# Patient Record
Sex: FEMALE | Race: BLACK OR AFRICAN AMERICAN
[De-identification: names, ages, dates, MRNs, and addresses within clinical notes are randomized per-mention and may not be internally consistent; named-entity substitution may affect disease eponyms.]

---

## 2019-05-31 ENCOUNTER — HOSPITAL ENCOUNTER (EMERGENCY)
Dept: HOSPITAL 62 - ER | Age: 54
LOS: 1 days | Discharge: HOME | End: 2019-06-01
Payer: COMMERCIAL

## 2019-05-31 DIAGNOSIS — R19.7: Primary | ICD-10-CM

## 2019-05-31 DIAGNOSIS — R10.84: ICD-10-CM

## 2019-05-31 DIAGNOSIS — F17.210: ICD-10-CM

## 2019-05-31 DIAGNOSIS — E86.0: ICD-10-CM

## 2019-05-31 LAB
ADD MANUAL DIFF: NO
ALBUMIN SERPL-MCNC: 5.6 G/DL (ref 3.5–5)
ALP SERPL-CCNC: 171 U/L (ref 38–126)
ALT SERPL-CCNC: 47 U/L (ref 9–52)
ANION GAP SERPL CALC-SCNC: 21 MMOL/L (ref 5–19)
APPEARANCE UR: (no result)
APTT PPP: YELLOW S
AST SERPL-CCNC: 26 U/L (ref 14–36)
BASOPHILS # BLD AUTO: 0 10^3/UL (ref 0–0.2)
BASOPHILS NFR BLD AUTO: 0.5 % (ref 0–2)
BILIRUB DIRECT SERPL-MCNC: 0.4 MG/DL (ref 0–0.4)
BILIRUB SERPL-MCNC: 0.6 MG/DL (ref 0.2–1.3)
BILIRUB UR QL STRIP: (no result)
BUN SERPL-MCNC: 19 MG/DL (ref 7–20)
CALCIUM: 11 MG/DL (ref 8.4–10.2)
CHLORIDE SERPL-SCNC: 101 MMOL/L (ref 98–107)
CO2 SERPL-SCNC: 21 MMOL/L (ref 22–30)
EOSINOPHIL # BLD AUTO: 0 10^3/UL (ref 0–0.6)
EOSINOPHIL NFR BLD AUTO: 0.5 % (ref 0–6)
ERYTHROCYTE [DISTWIDTH] IN BLOOD BY AUTOMATED COUNT: 14.7 % (ref 11.5–14)
GLUCOSE SERPL-MCNC: 139 MG/DL (ref 75–110)
GLUCOSE UR STRIP-MCNC: 50 MG/DL
HCT VFR BLD CALC: 48.1 % (ref 36–47)
HGB BLD-MCNC: 15.7 G/DL (ref 12–15.5)
KETONES UR STRIP-MCNC: (no result) MG/DL
LIPASE SERPL-CCNC: 285.5 U/L (ref 23–300)
LYMPHOCYTES # BLD AUTO: 1.8 10^3/UL (ref 0.5–4.7)
LYMPHOCYTES NFR BLD AUTO: 19.4 % (ref 13–45)
MCH RBC QN AUTO: 26.8 PG (ref 27–33.4)
MCHC RBC AUTO-ENTMCNC: 32.7 G/DL (ref 32–36)
MCV RBC AUTO: 82 FL (ref 80–97)
MONOCYTES # BLD AUTO: 0.7 10^3/UL (ref 0.1–1.4)
MONOCYTES NFR BLD AUTO: 7.4 % (ref 3–13)
NEUTROPHILS # BLD AUTO: 6.6 10^3/UL (ref 1.7–8.2)
NEUTS SEG NFR BLD AUTO: 72.2 % (ref 42–78)
NITRITE UR QL STRIP: NEGATIVE
PH UR STRIP: 5 [PH] (ref 5–9)
PLATELET # BLD: 342 10^3/UL (ref 150–450)
POTASSIUM SERPL-SCNC: 3.6 MMOL/L (ref 3.6–5)
PROT SERPL-MCNC: 9.3 G/DL (ref 6.3–8.2)
PROT UR STRIP-MCNC: 100 MG/DL
RBC # BLD AUTO: 5.87 10^6/UL (ref 3.72–5.28)
SODIUM SERPL-SCNC: 143.4 MMOL/L (ref 137–145)
SP GR UR STRIP: 1.03
TOTAL CELLS COUNTED % (AUTO): 100 %
UROBILINOGEN UR-MCNC: NEGATIVE MG/DL (ref ?–2)
WBC # BLD AUTO: 9.1 10^3/UL (ref 4–10.5)

## 2019-05-31 PROCEDURE — 85025 COMPLETE CBC W/AUTO DIFF WBC: CPT

## 2019-05-31 PROCEDURE — 80053 COMPREHEN METABOLIC PANEL: CPT

## 2019-05-31 PROCEDURE — 87205 SMEAR GRAM STAIN: CPT

## 2019-05-31 PROCEDURE — 99284 EMERGENCY DEPT VISIT MOD MDM: CPT

## 2019-05-31 PROCEDURE — 81001 URINALYSIS AUTO W/SCOPE: CPT

## 2019-05-31 PROCEDURE — 87045 FECES CULTURE AEROBIC BACT: CPT

## 2019-05-31 PROCEDURE — 89055 LEUKOCYTE ASSESSMENT FECAL: CPT

## 2019-05-31 PROCEDURE — 83690 ASSAY OF LIPASE: CPT

## 2019-05-31 PROCEDURE — 36415 COLL VENOUS BLD VENIPUNCTURE: CPT

## 2019-05-31 PROCEDURE — 87493 C DIFF AMPLIFIED PROBE: CPT

## 2019-05-31 NOTE — ER DOCUMENT REPORT
ED Medical Screen (RME)





- General


Chief Complaint: Diarrhea


Stated Complaint: DIARRHEA


Time Seen by Provider: 05/31/19 18:24


Mode of Arrival: Ambulatory


Information source: Patient


Notes: 





Patient is a 54-year-old female comes emergency room with a complaint of having 

diarrhea ever since May 22.  She states that she went to the walk-in clinic on 

this past Tuesday because of the diarrhea and she was placed on Cipro, potassium

supplement, and Phenergan.  She states that labs were run and she was told her 

labs were normal but decided to put her on antibiotic anyway.  She states that 

she has not had any let up in her diarrhea.  She is been unable to eat very much

at all daily since the diarrhea started she is also had vomiting.  She states 

that her stool on the first 4 days was black watery diarrhea.  It is since kind 

of cleared up.  She denies using any Imodium or Pepto-Bismol but she did take 

some Kaopectate.  She tells me she is having at least 6-8 watery stools a day 

now.  And she wakes up each morning having "messed in her bed".  She denies have

any fever.  She denies having any abdominal pain with the exception of Cramping 

occasionally.  She also denies any other medical history.


TRAVEL OUTSIDE OF THE [place] IN LAST 30 DAYS: No





- HPI


Onset: Other


Onset/Duration: Sudden - 9 days ago, Persistent, Worse


Quality of pain: No pain


Severity: Moderate


Pain Level: 3


Associated Symptoms: Chills, Diarrhea, Nausea, Vomiting.  denies: Abdominal pain


Exacerbated by: Food


Relieved by: Denies


Similar symptoms previously: Yes


Recently seen / treated by doctor: Yes





- Related Data


Smoking: Less than 1 pack/day


Frequency of alcohol use: None


Drug Abuse: None


Allergies/Adverse Reactions: 


                                        





No Known Allergies Allergy (Unverified 05/31/19 17:11)


   











Past Medical History





- General


Information source: Patient





- Social History


Cigarette use (# per day): Yes - Quarter pack of the


Chew tobacco use (# tins/day): No


Frequency of alcohol use: None


Drug Abuse: None, Marijuana


Lives with: Family


Family history: Reviewed & Not Pertinent


Renal/ Medical History: Denies: Hx Peritoneal Dialysis





Review of Systems





- Review of Systems


Constitutional: See HPI, Weakness


EENT: No symptoms reported


Cardiovascular: No symptoms reported


Respiratory: No symptoms reported


Gastrointestinal: See HPI, Diarrhea, Nausea, Vomiting


Genitourinary: No symptoms reported


Female Genitourinary: No symptoms reported


Musculoskeletal: No symptoms reported


Skin: No symptoms reported


Hematologic/Lymphatic: No symptoms reported


Neurological/Psychological: No symptoms reported


-: Yes All other systems reviewed and negative





Physical Exam





- Vital signs


Vitals: 





                                        











Temp Pulse Resp BP Pulse Ox


 


 98.5 F   118 H  16   110/72   100 


 


 05/31/19 17:17  05/31/19 17:17  05/31/19 17:17  05/31/19 17:17  05/31/19 17:17











Interpretation: Hypotensive, Tachycardic





- Notes


Notes: 





PHYSICAL EXAMINATION:





GENERAL: Patient is well-nourished well-developed 54-year-old female who is in 

no apparent distress on physical exam.  She does not appear in any discomfort or

pain at this time.





HEAD: Atraumatic, normocephalic.





EYES: Pupils equal round and reactive to light, extraocular movements intact, 

conjunctiva are normal.





ENT: Nares patent, oropharynx clear without exudates.  Moist mucous membranes.





NECK: Normal range of motion, supple without lymphadenopathy





LUNGS: Breath sounds clear to auscultation bilaterally and equal.  No wheezes 

rales or rhonchi.





HEART: Tachycardic rate and rhythm without murmurs





ABDOMEN: Soft, nontender, nondistended abdomen.  No guarding, no rebound.  No 

masses appreciated.





Female : deferred





Musculoskeletal: Normal range of motion, no pitting or edema.  No cyanosis.





NEUROLOGICAL:  Normal speech, normal gait.  Normal sensory, motor exams





PSYCH: Normal mood, normal affect.





SKIN: Warm, Dry, normal turgor, no rashes or lesions noted.





Course





- Vital Signs


Vital signs: 





                                        











Temp Pulse Resp BP Pulse Ox


 


 98.5 F   118 H  16   110/72   100 


 


 05/31/19 17:17  05/31/19 17:17  05/31/19 17:17  05/31/19 17:17  05/31/19 17:17

## 2019-06-01 VITALS — DIASTOLIC BLOOD PRESSURE: 74 MMHG | SYSTOLIC BLOOD PRESSURE: 110 MMHG

## 2019-06-01 NOTE — ER DOCUMENT REPORT
ED General





- General


Chief Complaint: Diarrhea


Stated Complaint: DIARRHEA


Time Seen by Provider: 05/31/19 18:24


Mode of Arrival: Ambulatory


Notes: 





Patient is a 54-year-old female who denies chronic medical problems who presents

with complaints of 4 days of diarrhea.  Patient states that the watery diarrhea 

started several days ago, initially started to improve and then seemed to worsen

again.  She was seen in urgent care, started on antibiotics "just in case".  She

comes the emergency department tonight due to the persistence he of her diarrhea

despite taking antibiotics and questions whether or not antibiotics are even 

making her worse.  She notes some intermittent, mild, generalized abdominal 

cramping not currently present.  She notes that her diarrhea has slowed and she 

has not had a bowel movement within the past several hours.  Nothing has been 

noted to improve or worsen her symptoms.  Denies fever, vomiting, or syncope.


TRAVEL OUTSIDE OF THE [place] IN LAST 30 DAYS: No





- Related Data


Allergies/Adverse Reactions: 


                                        





No Known Allergies Allergy (Unverified 05/31/19 17:11)


   











Past Medical History





- General


Information source: Patient





- Social History


Smoking Status: Current Every Day Smoker


Cigarette use (# per day): Yes - Quarter pack of the


Chew tobacco use (# tins/day): No


Frequency of alcohol use: None


Drug Abuse: None, Marijuana


Lives with: Family


Family History: Reviewed & Not Pertinent


Patient has suicidal ideation: No


Patient has homicidal ideation: No


Renal/ Medical History: Denies: Hx Peritoneal Dialysis





Review of Systems





- Review of Systems


Notes: 





Constitutional: Negative for fever.


HENT: Negative for sore throat.


Eyes: Negative for visual changes.


Cardiovascular: Negative for chest pain.


Respiratory: Negative for shortness of breath.


Gastrointestinal: Positive for intermittent abdominal cramping, diarrhea


Genitourinary: Negative for dysuria.


Musculoskeletal: Negative for back pain.


Skin: Negative for rash.


Neurological: Negative for headaches, weakness or numbness.





10 point ROS negative except as marked above and in HPI.





Physical Exam





- Vital signs


Vitals: 


                                        











Temp Pulse Resp BP Pulse Ox


 


 98.5 F   118 H  16   110/72   100 


 


 05/31/19 17:17  05/31/19 17:17  05/31/19 17:17  05/31/19 17:17  05/31/19 17:17











Interpretation: Tachycardic


Notes: 





PHYSICAL EXAMINATION:





GENERAL: Well-appearing, well-nourished and in no acute distress.





HEAD: Atraumatic, normocephalic.





EYES: Pupils equal round and reactive to light, extraocular movements intact, 

sclera anicteric, conjunctiva are normal.





ENT: nares patent, oropharynx clear without exudates.  Moderately dry mucous 

membranes.





NECK: Normal range of motion, supple without lymphadenopathy





LUNGS: Breath sounds clear to auscultation bilaterally and equal.  No wheezes 

rales or rhonchi.





HEART: Regular rate and rhythm without murmurs





ABDOMEN: Soft, nontender, normoactive bowel sounds.  No guarding, no rebound.  

No masses appreciated.





EXTREMITIES: Normal range of motion, no pitting or edema.  No cyanosis.





NEUROLOGICAL: No focal neurological deficits. Moves all extremities 

spontaneously and on command.





PSYCH: Normal mood, normal affect.





SKIN: Warm, Dry, normal turgor, no rashes or lesions noted.





Course





- Re-evaluation


Re-evalutation: 





06/01/19 00:59


Patient presents with concerns of 4 days of watery diarrhea although has not had

any diarrhea while here in the emergency department by her own report.  She has 

not had any associated nausea or vomiting.  Vitals are within normal limits.  

She has no focal abdominal tenderness on exam.  Patient was noted to be mildly 

tachycardic at time of presentation but this has resolved at the time of my 

evaluation.  Patient was started on ciprofloxacin by an urgent care which I have

instructed her to stop as it appears that her diarrhea started to get worse once

this medication was started and there is no evidence of a bacterial infection at

this point and she does not meet IDSA criteria for initiation of antibiotics at 

this time.  Laboratories show mild renal dysfunction, unclear if this is acute 

or chronic in nature.  Patient has been instructed to discontinue the 

medications as prescribed and follow-up with her primary care physician.  Given 

absence of any focal abdominal pain by complaint or by exam very low clinical 

suspicion for surgical or life-threatening pathology such as biliary, appendicit

is, pancreatitis, ischemic colitis or mesenteric ischemia.  At this time will 

discharge with return precautions and follow-up recommendations.  Verbal 

discharge instructions given a the bedside and opportunity for questions given. 

Medication warnings reviewed. Patient is in agreement with this plan and has 

verbalized understanding of return precautions and the need for primary care 

follow-up in the next 24-72 hours.


06/01/19 01:00








- Vital Signs


Vital signs: 


                                        











Temp Pulse Resp BP Pulse Ox


 


 98 F   78   16   110/74   99 


 


 06/01/19 01:10  06/01/19 01:10  06/01/19 01:10  06/01/19 01:10  06/01/19 01:10














- Laboratory


Result Diagrams: 


                                 05/31/19 18:45





                                 05/31/19 18:45


Laboratory results interpreted by me: 


                                        











  05/31/19 05/31/19 05/31/19





  18:45 18:45 18:45


 


RBC  5.87 H  


 


Hgb  15.7 H  


 


Hct  48.1 H  


 


MCH  26.8 L  


 


RDW  14.7 H  


 


Carbon Dioxide   21 L 


 


Anion Gap   21 H 


 


Creatinine   1.29 H 


 


Est GFR ( Amer)   52 L 


 


Est GFR (Non-Af Amer)   43 L 


 


Glucose   139 H 


 


Calcium   11.0 H 


 


Alkaline Phosphatase   171 H 


 


Total Protein   9.3 H 


 


Albumin   5.6 H 


 


Urine Protein    100 H


 


Urine Glucose (UA)    50 H


 


Urine Ketones    TRACE H


 


Urine Bilirubin    SMALL H


 


Ur Leukocyte Esterase    MODERATE H














Discharge





- Discharge


Clinical Impression: 


 Dehydration





Diarrhea


Qualifiers:


 Diarrhea type: presumed infectious Qualified Code(s): R19.7 - Diarrhea, 

unspecified





Condition: Stable


Disposition: HOME, SELF-CARE


Additional Instructions: 


Your symptoms are likely due to a viral illness and should resolve in the next 

several days.  You can take over-the-counter loperamide also known as Imodium as

needed for diarrhea per box instructions.  Continue to stay hydrated with plenty

of solution such as Gatorade or Pedialyte.   Please return if you develop severe

abdominal pain, pass out, become unable to tolerate any oral fluids for 12 more 

hours, or any other symptoms that are concerning to you.  Discontinue the 

antibiotics that were prescribed as these will likely make your diarrhea worse. 

If your diarrhea does persist for greater than a total of 7 days you may take 

the ciprofloxacin that you prescribed but only for 3 days as opposed to the 10-

day course that was written.  Follow-up with your primary care doctor within the

next 48 to 72 hours.